# Patient Record
Sex: FEMALE | Race: WHITE | Employment: OTHER | URBAN - METROPOLITAN AREA
[De-identification: names, ages, dates, MRNs, and addresses within clinical notes are randomized per-mention and may not be internally consistent; named-entity substitution may affect disease eponyms.]

---

## 2017-05-31 ENCOUNTER — HOSPITAL ENCOUNTER (EMERGENCY)
Age: 57
Discharge: HOME | End: 2017-05-31
Attending: EMERGENCY MEDICINE
Payer: COMMERCIAL

## 2017-05-31 ENCOUNTER — OFFICE VISIT (OUTPATIENT)
Dept: FAMILY MEDICINE CLINIC | Age: 57
End: 2017-05-31

## 2017-05-31 VITALS
TEMPERATURE: 97.4 F | OXYGEN SATURATION: 98 % | RESPIRATION RATE: 16 BRPM | HEART RATE: 80 BPM | WEIGHT: 144 LBS | HEIGHT: 64 IN | SYSTOLIC BLOOD PRESSURE: 152 MMHG | BODY MASS INDEX: 24.59 KG/M2 | DIASTOLIC BLOOD PRESSURE: 92 MMHG

## 2017-05-31 VITALS
RESPIRATION RATE: 16 BRPM | BODY MASS INDEX: 24.75 KG/M2 | OXYGEN SATURATION: 95 % | SYSTOLIC BLOOD PRESSURE: 130 MMHG | TEMPERATURE: 97.9 F | HEIGHT: 64 IN | DIASTOLIC BLOOD PRESSURE: 76 MMHG | HEART RATE: 76 BPM | WEIGHT: 145 LBS

## 2017-05-31 DIAGNOSIS — R42 DIZZINESS: Primary | ICD-10-CM

## 2017-05-31 DIAGNOSIS — R42 VERTIGO: Primary | ICD-10-CM

## 2017-05-31 LAB
ALBUMIN SERPL BCP-MCNC: 3.9 G/DL (ref 3.5–5)
ALBUMIN/GLOB SERPL: 1.1 {RATIO} (ref 1.1–2.2)
ALP SERPL-CCNC: 64 U/L (ref 45–117)
ALT SERPL-CCNC: 16 U/L (ref 12–78)
ANION GAP BLD CALC-SCNC: 10 MMOL/L (ref 5–15)
APPEARANCE UR: CLEAR
AST SERPL W P-5'-P-CCNC: 13 U/L (ref 15–37)
ATRIAL RATE: 70 BPM
BASOPHILS # BLD AUTO: 0.1 K/UL (ref 0–0.1)
BASOPHILS # BLD: 1 % (ref 0–1)
BILIRUB SERPL-MCNC: 0.2 MG/DL (ref 0.2–1)
BILIRUB UR QL: NEGATIVE
BUN SERPL-MCNC: 17 MG/DL (ref 6–20)
BUN/CREAT SERPL: 22 (ref 12–20)
CALCIUM SERPL-MCNC: 8.8 MG/DL (ref 8.5–10.1)
CALCULATED P AXIS, ECG09: 35 DEGREES
CALCULATED R AXIS, ECG10: 39 DEGREES
CALCULATED T AXIS, ECG11: 46 DEGREES
CHLORIDE SERPL-SCNC: 100 MMOL/L (ref 97–108)
CO2 SERPL-SCNC: 27 MMOL/L (ref 21–32)
COLOR UR: NORMAL
CREAT SERPL-MCNC: 0.76 MG/DL (ref 0.55–1.02)
DIAGNOSIS, 93000: NORMAL
DIFFERENTIAL METHOD BLD: ABNORMAL
EOSINOPHIL # BLD: 0.2 K/UL (ref 0–0.4)
EOSINOPHIL NFR BLD: 2 % (ref 0–7)
ERYTHROCYTE [DISTWIDTH] IN BLOOD BY AUTOMATED COUNT: 13.4 % (ref 11.5–14.5)
GLOBULIN SER CALC-MCNC: 3.5 G/DL (ref 2–4)
GLUCOSE SERPL-MCNC: 137 MG/DL (ref 65–100)
GLUCOSE UR STRIP.AUTO-MCNC: NEGATIVE MG/DL
HCT VFR BLD AUTO: 39.4 % (ref 35–47)
HGB BLD-MCNC: 12.8 G/DL (ref 11.5–16)
HGB UR QL STRIP: NEGATIVE
KETONES UR QL STRIP.AUTO: NEGATIVE MG/DL
LEUKOCYTE ESTERASE UR QL STRIP.AUTO: NEGATIVE
LIPASE SERPL-CCNC: 136 U/L (ref 73–393)
LYMPHOCYTES # BLD AUTO: 10 % (ref 12–49)
LYMPHOCYTES # BLD: 0.8 K/UL (ref 0.8–3.5)
MCH RBC QN AUTO: 31.8 PG (ref 26–34)
MCHC RBC AUTO-ENTMCNC: 32.5 G/DL (ref 30–36.5)
MCV RBC AUTO: 98 FL (ref 80–99)
MONOCYTES # BLD: 0.2 K/UL (ref 0–1)
MONOCYTES NFR BLD AUTO: 2 % (ref 5–13)
NEUTS SEG # BLD: 6.6 K/UL (ref 1.8–8)
NEUTS SEG NFR BLD AUTO: 85 % (ref 32–75)
NITRITE UR QL STRIP.AUTO: NEGATIVE
P-R INTERVAL, ECG05: 128 MS
PH UR STRIP: 7 [PH] (ref 5–8)
PLATELET # BLD AUTO: 304 K/UL (ref 150–400)
POTASSIUM SERPL-SCNC: 3.7 MMOL/L (ref 3.5–5.1)
PROT SERPL-MCNC: 7.4 G/DL (ref 6.4–8.2)
PROT UR STRIP-MCNC: NEGATIVE MG/DL
Q-T INTERVAL, ECG07: 414 MS
QRS DURATION, ECG06: 86 MS
QTC CALCULATION (BEZET), ECG08: 447 MS
RBC # BLD AUTO: 4.02 M/UL (ref 3.8–5.2)
RBC MORPH BLD: ABNORMAL
SODIUM SERPL-SCNC: 137 MMOL/L (ref 136–145)
SP GR UR REFRACTOMETRY: 1.01 (ref 1–1.03)
UROBILINOGEN UR QL STRIP.AUTO: 0.2 EU/DL (ref 0.2–1)
VENTRICULAR RATE, ECG03: 70 BPM
WBC # BLD AUTO: 7.9 K/UL (ref 3.6–11)

## 2017-05-31 PROCEDURE — 74011250637 HC RX REV CODE- 250/637: Performed by: EMERGENCY MEDICINE

## 2017-05-31 PROCEDURE — 80053 COMPREHEN METABOLIC PANEL: CPT | Performed by: EMERGENCY MEDICINE

## 2017-05-31 PROCEDURE — 81003 URINALYSIS AUTO W/O SCOPE: CPT | Performed by: EMERGENCY MEDICINE

## 2017-05-31 PROCEDURE — 83690 ASSAY OF LIPASE: CPT | Performed by: EMERGENCY MEDICINE

## 2017-05-31 PROCEDURE — 85025 COMPLETE CBC W/AUTO DIFF WBC: CPT | Performed by: EMERGENCY MEDICINE

## 2017-05-31 PROCEDURE — 93005 ELECTROCARDIOGRAM TRACING: CPT

## 2017-05-31 PROCEDURE — 96374 THER/PROPH/DIAG INJ IV PUSH: CPT

## 2017-05-31 PROCEDURE — 96361 HYDRATE IV INFUSION ADD-ON: CPT

## 2017-05-31 PROCEDURE — 99285 EMERGENCY DEPT VISIT HI MDM: CPT

## 2017-05-31 PROCEDURE — 36415 COLL VENOUS BLD VENIPUNCTURE: CPT | Performed by: EMERGENCY MEDICINE

## 2017-05-31 PROCEDURE — 74011250636 HC RX REV CODE- 250/636: Performed by: EMERGENCY MEDICINE

## 2017-05-31 RX ORDER — ONDANSETRON 2 MG/ML
4 INJECTION INTRAMUSCULAR; INTRAVENOUS
Status: COMPLETED | OUTPATIENT
Start: 2017-05-31 | End: 2017-05-31

## 2017-05-31 RX ORDER — ONDANSETRON 4 MG/1
4 TABLET, ORALLY DISINTEGRATING ORAL
Qty: 12 TAB | Refills: 0 | Status: SHIPPED | OUTPATIENT
Start: 2017-05-31 | End: 2018-09-06

## 2017-05-31 RX ORDER — MECLIZINE HYDROCHLORIDE 25 MG/1
25 TABLET ORAL
Qty: 20 TAB | Refills: 0 | Status: SHIPPED | OUTPATIENT
Start: 2017-05-31 | End: 2017-06-10

## 2017-05-31 RX ORDER — ALPRAZOLAM 0.25 MG/1
0.25 TABLET ORAL
Status: COMPLETED | OUTPATIENT
Start: 2017-05-31 | End: 2017-05-31

## 2017-05-31 RX ORDER — MECLIZINE HYDROCHLORIDE 25 MG/1
25 TABLET ORAL
Status: COMPLETED | OUTPATIENT
Start: 2017-05-31 | End: 2017-05-31

## 2017-05-31 RX ORDER — ALPRAZOLAM 0.25 MG/1
0.25 TABLET ORAL
Qty: 20 TAB | Refills: 0 | Status: SHIPPED | OUTPATIENT
Start: 2017-05-31 | End: 2018-09-06

## 2017-05-31 RX ADMIN — ALPRAZOLAM 0.25 MG: 0.25 TABLET ORAL at 11:24

## 2017-05-31 RX ADMIN — ONDANSETRON 4 MG: 2 INJECTION INTRAMUSCULAR; INTRAVENOUS at 11:24

## 2017-05-31 RX ADMIN — MECLIZINE HYDROCHLORIDE 25 MG: 25 TABLET ORAL at 11:24

## 2017-05-31 RX ADMIN — SODIUM CHLORIDE 1000 ML: 900 INJECTION, SOLUTION INTRAVENOUS at 11:24

## 2017-05-31 NOTE — PROGRESS NOTES
Subjective:      Brian Bo is an 56 y.o. female who presents for evaluation of dizziness. Having dizziness, not vertigo, starting this am. Never had this bad dizziness before. Vomited twice this morning. Having blurry vision since then. Denies ear pain, ear fullness, CP, SOB, palpitations, HA, trauma, recent outdoor activities, slurred speech, or focal weakness. No hx of cardiac disease, Fhx of stroke. Hasn't tried any medications. She can walk, but feels like she will be off the balance. Review of Systems   Constitutional: Negative for chills and fever. Eyes: Positive for blurred vision. Respiratory: Negative for cough and shortness of breath. Cardiovascular: Negative for chest pain. Gastrointestinal: Positive for nausea and vomiting. Negative for abdominal pain, constipation and diarrhea. Genitourinary: Negative. Neurological: Positive for dizziness. Negative for tingling, tremors, sensory change, speech change, focal weakness, seizures, loss of consciousness and headaches. Psychiatric/Behavioral: Negative. Past Medical History:   Diagnosis Date    CTS (carpal tunnel syndrome) 4/21/2010    IBS (irritable bowel syndrome) 4/21/2010    [facility]         No past surgical history on file. Current Outpatient Prescriptions   Medication Sig Dispense Refill    mesalamine ER (APRISO) 0.375 gram capsule Take 1.5 g by mouth daily.  conj estrog-medroxyprogest ace (PREMPRO) 0.625-5 mg per tablet Take 1 Tab by mouth daily.  hyoscyamine SR (LEVBID) 0.375 mg SR tablet Take 375 mcg by mouth every twelve (12) hours as needed for Cramping.  Mesalamine SR (APRISO) 0.375 gram Cp24 Take  by mouth.  azaTHIOprine (IMURAN) 50 mg tablet Take 50 mg by mouth daily.  hyoscyamine SR (HYOMAX-SR) 0.375 mg SR tablet Take 375 mcg by mouth every twelve (12) hours as needed.         ethynodiol-ethinyl estradiol (ZOVIA 1/35E, 28,) 1-35 mg-mcg per tablet Take  by mouth. No Known Allergies  Family History   Problem Relation Age of Onset    Cancer Father      lung     Social History     Social History    Marital status:      Spouse name: N/A    Number of children: N/A    Years of education: N/A     Occupational History    Not on file. Social History Main Topics    Smoking status: Never Smoker    Smokeless tobacco: Never Used    Alcohol use Yes      Comment: social    Drug use: Not on file    Sexual activity: Yes     Partners: Male     Other Topics Concern    Not on file     Social History Narrative           Objective:     Visit Vitals    BP (!) 152/92 (BP 1 Location: Left arm, BP Patient Position: Standing)    Pulse 80    Temp 97.4 °F (36.3 °C) (Oral)    Resp 16    Ht 5' 4\" (1.626 m)    Wt 144 lb (65.3 kg)    SpO2 98%    BMI 24.72 kg/m2         Physical Exam:  General: calm, relaxed, appears nondistressed   Skin: no rashes or lesions noted, no erythema, ecchymoses, or petechiae, no urticaria   HEENT:  normocephalic/atraumatic, no facial droop, PERLLA/EOMI, pharynx clear,  speech clear and fluent. Conjunctivae/Cornea clear, dried MM. Neck:  supple, no meningismus, no thyromegaly or thyroid tenderness, no detectable cervical bruit, and no JVD  Supple, FROM, normal appearance, symmetrical, trachea midline. No palpable adenopathy   Chest Wall: no tenderness or deformity.    Lungs:  normal symmetric expansion - clear to auscultation, no stridor, rales, rhonchi, or wheezes heard   Cardiac:  regular rate and rhythm, normal S1/S2, no obvious murmur, gallop, or rub, PMI appears nondisplaced   Abdomen:  soft, nontender, normally active bowel sounds, no guarding or rebound, no mass or pulsatile mass; no ballotable ascites or fluid wave   Back:  No CVA tenderness   Musculoskeletal: Gait not tested  Normal symmetry, ROM, strength and tone   Extremities:  no edema or calf tenderness, no palpable cord, pulses intact  No DVT signs   Pulses:  2+ and symmetric all extremities   Neurologic:  awake, alert, oriented x 3, CNs 2-12 intact, no facial droop, speech clear and fluent, no sensory deficit, motor grossly intact,     Very slow gait. During romberg's test, she complaints that she will be off the balance and her body moves left and right. Orthostatic BP: negative. Assessment / Plan:     1. Dizziness  DDx includes vestibular or neurologic origin. Will send her to ED to make sure that she is stablized with hydration and medications. If not, she probably needs work up to rule out stroke. AVS was printed, given to patient and briefly discussed prior to patient's departure from the office today.      Cindy Corona MD  [facility] Medicine Resident  [phone]  Melissa Johnson [address]

## 2017-05-31 NOTE — ED PROVIDER NOTES
Patient is a 56 y.o. female presenting with dizziness. Dizziness   Pertinent negatives include no shortness of breath, no vomiting and no headaches. Pt states that she awoke this morning with dizziness when she got up to walk to the bathroom accompanied by nausea. The dizziness worsens with head movement. She was evaluated at [facility] and sent for further evaluation. Denies fever, cold symptoms, headache, neck pain, visual changes, focal weakness or rash. Denies any difficulty breathing, difficulty swallowing, SOB, chest pain or abdominal pain. She reports nausea but denies any vomiting or diarrhea. Pt. Reports that she has not had any food or medications today prior to arrival.   Old charts reviewed      Past Medical History:   Diagnosis Date    CTS (carpal tunnel syndrome) 4/21/2010    IBS (irritable bowel syndrome) 4/21/2010    [facility]           History reviewed. No pertinent surgical history. Family History:   Problem Relation Age of Onset    Cancer Father      lung       Social History     Social History    Marital status:      Spouse name: N/A    Number of children: N/A    Years of education: N/A     Occupational History    Not on file. Social History Main Topics    Smoking status: Never Smoker    Smokeless tobacco: Never Used    Alcohol use Yes      Comment: social    Drug use: Not on file    Sexual activity: Yes     Partners: Male     Other Topics Concern    Not on file     Social History Narrative         ALLERGIES: Review of patient's allergies indicates no known allergies. Review of Systems   Constitutional: Negative for activity change and appetite change. HENT: Negative for facial swelling, sore throat and trouble swallowing. Eyes: Negative. Respiratory: Negative for shortness of breath. Cardiovascular: Negative. Gastrointestinal: Negative for abdominal pain, diarrhea and vomiting.    Genitourinary: Negative for dysuria. Musculoskeletal: Negative for back pain and neck pain. Skin: Negative for color change. Neurological: Positive for dizziness. Negative for headaches. Psychiatric/Behavioral: Negative. Vitals:    05/31/17 1046 05/31/17 1108 05/31/17 1113 05/31/17 1115   BP: 154/77 136/78  140/80   Pulse: 76      Resp: 16      Temp: 97.9 °F (36.6 °C)      SpO2: 99%  100% 95%   Weight: 65.8 kg (145 lb)      Height: 5' 4\" (1.626 m)               Physical Exam   Constitutional: She is oriented to person, place, and time. She appears well-nourished. White female; non smoker; runs a home ;    HENT:   Head: Normocephalic. Right Ear: External ear normal.   Left Ear: External ear normal.   Mouth/Throat: Oropharynx is clear and moist.   Eyes: Pupils are equal, round, and reactive to light. Neck: Normal range of motion. Neck supple. Cardiovascular: Normal rate and regular rhythm. Pulmonary/Chest: Effort normal and breath sounds normal.   Abdominal: Soft. Bowel sounds are normal.   Musculoskeletal: Normal range of motion. Lymphadenopathy:     She has no cervical adenopathy. Neurological: She is alert and oriented to person, place, and time. Skin: Skin is warm and dry. No rash noted. Nursing note and vitals reviewed. MDM  ED Course       Procedures      EKG reveals NSR without ectopy; ventricular rate 70. Reviewed by Dr. Artem Paredes and me. Yun Barron NP    Pt has been re-examined and is feeling slightly better. 1:25 PM  Patient's results and plan of care have been reviewed with her and her . Patient and/or family have verbally conveyed their understanding and agreement of the patient's signs, symptoms, diagnosis, treatment and prognosis and additionally agree to follow up as recommended or return to the Emergency Room should her condition change prior to follow-up.   Discharge instructions have also been provided to the patient with some educational information regarding her diagnosis as well a list of reasons why she would want to return to the ER prior to her follow-up appointment should her condition change. Hima Soares NP  Discussed plan of care with Dr. Duncan Freeman.  Hima Soares NP

## 2017-05-31 NOTE — MR AVS SNAPSHOT
Visit Information Date & Time Provider Department Dept. Phone Encounter #  
 5/31/2017  9:30 AM Jean-Pierre Casey MD [address] [facility] [phone] [id number] Upcoming Health Maintenance Date Due Hepatitis C Screening 1960 FOBT Q 1 YEAR AGE 50-75 3/23/2016 BREAST CANCER SCRN MAMMOGRAM 5/1/2016 PAP AKA CERVICAL CYTOLOGY 5/1/2017 INFLUENZA AGE 9 TO ADULT 8/1/2017 DTaP/Tdap/Td series (2 - Td) 3/23/2025 Allergies as of 5/31/2017  Review Complete On: 5/31/2017 By: Jean-Pierre Casey MD  
 No Known Allergies Current Immunizations  Reviewed on 3/23/2015 Name Date Influenza Vaccine Split 11/15/2010 Influenza Vaccine Whole 12/30/2009 Tdap 3/23/2015 Not reviewed this visit You Were Diagnosed With   
  
 Codes Comments Dizziness    -  Primary ICD-10-CM: I77 ICD-9-CM: 780.4 Vitals BP Pulse Temp Resp Height(growth percentile) Weight(growth percentile) (!) 152/92 (BP 1 Location: Left arm, BP Patient Position: Standing) 80 97.4 °F (36.3 °C) (Oral) 16 5' 4\" (1.626 m) 144 lb (65.3 kg) SpO2 BMI OB Status Smoking Status 98% 24.72 kg/m2 Having regular periods Never Smoker Vitals History BMI and BSA Data Body Mass Index Body Surface Area 24.72 kg/m 2 1.72 m 2 Preferred Pharmacy Pharmacy Name Phone [id number] [address] [facility] [phone] Your Updated Medication List  
  
   
This list is accurate as of: 5/31/17 10:22 AM.  Always use your most recent med list.  
  
  
  
  
 * mesalamine ER 0.375 gram capsule Commonly known as:  APRISO Take 1.5 g by mouth daily. * APRISO 0.375 gram capsule Generic drug:  mesalamine ER Take  by mouth. azaTHIOprine 50 mg tablet Commonly known as:  [employer] Take 50 mg by mouth daily. * LEVBID 0.375 mg SR tablet Generic drug:  hyoscyamine SR  
 Take 375 mcg by mouth every twelve (12) hours as needed for Cramping. * HYOMAX-SR 0.375 mg SR tablet Generic drug:  hyoscyamine SR Take 375 mcg by mouth every twelve (12) hours as needed. PREMPRO 0.625-5 mg per tablet Generic drug:  estrogen (conjugated)-medroxyPROGESTERone Take 1 Tab by mouth daily. ZOVIA 1/35E (28) 1-35 mg-mcg Tab Generic drug:  ethynodiol-ethinyl estradiol Take  by mouth. * Notice: This list has 4 medication(s) that are the same as other medications prescribed for you. Read the directions carefully, and ask your doctor or other care provider to review them with you. Patient Instructions Dizziness: Care Instructions Your Care Instructions Dizziness is the feeling of unsteadiness or fuzziness in your head. It is different than having vertigo, which is a feeling that the room is spinning or that you are moving or falling. It is also different from lightheadedness, which is the feeling that you are about to faint. It can be hard to know what causes dizziness. Some people feel dizzy when they have migraine headaches. Sometimes bouts of flu can make you feel dizzy. Some medical conditions, such as heart problems or high blood pressure, can make you feel dizzy. Many medicines can cause dizziness, including medicines for high blood pressure, pain, or anxiety. If a medicine causes your symptoms, your doctor may recommend that you stop or change the medicine. If it is a problem with your heart, you may need medicine to help your heart work better. If there is no clear reason for your symptoms, your doctor may suggest watching and waiting for a while to see if the dizziness goes away on its own. Follow-up care is a key part of your treatment and safety. Be sure to make and go to all appointments, and call your doctor if you are having problems. It's also a good idea to know your test results and keep a list of the medicines you take. How can you care for yourself at home? · If your doctor recommends or prescribes medicine, take it exactly as directed. Call your doctor if you think you are having a problem with your medicine. · Do not drive while you feel dizzy. · Try to prevent falls. Steps you can take include: ¨ Using nonskid mats, adding grab bars near the tub, and using night-lights. ¨ Clearing your home so that walkways are free of anything you might trip on. ¨ Letting family and friends know that you have been feeling dizzy. This will help them know how to help you. When should you call for help? Call 911 anytime you think you may need emergency care. For example, call if: 
· You passed out (lost consciousness). · You have dizziness along with symptoms of a heart attack. These may include: ¨ Chest pain or pressure, or a strange feeling in the chest. 
¨ Sweating. ¨ Shortness of breath. ¨ Nausea or vomiting. ¨ Pain, pressure, or a strange feeling in the back, neck, jaw, or upper belly or in one or both shoulders or arms. ¨ Lightheadedness or sudden weakness. ¨ A fast or irregular heartbeat. · You have symptoms of a stroke. These may include: 
¨ Sudden numbness, tingling, weakness, or loss of movement in your face, arm, or leg, especially on only one side of your body. ¨ Sudden vision changes. ¨ Sudden trouble speaking. ¨ Sudden confusion or trouble understanding simple statements. ¨ Sudden problems with walking or balance. ¨ A sudden, severe headache that is different from past headaches. Call your doctor now or seek immediate medical care if: 
· You feel dizzy and have a fever, headache, or ringing in your ears. · You have new or increased nausea and vomiting. · Your dizziness does not go away or comes back. Watch closely for changes in your health, and be sure to contact your doctor if: 
· You do not get better as expected. Where can you learn more? Go to http://abdoulaye.info/. Enter S718 in the search box to learn more about \"Dizziness: Care Instructions. \" Current as of: May 27, 2016 Content Version: 11.2 © 9925-9411 [employer], Reading Room. Care instructions adapted under license by [employer] (which disclaims liability or warranty for this information). If you have questions about a medical condition or this instruction, always ask your healthcare professional. [address] any warranty or liability for your use of this information. Introducing [facility]! Rosanna Jean-Baptiste introduces [employer] patient portal. Now you can access parts of your medical record, email your doctor's office, and request medication refills online. 1. In your internet browser, go to [email or web address] 2. Click on the First Time User? Click Here link in the Sign In box. You will see the New Member Sign Up page. 3. Enter your [employer] Access Code exactly as it appears below. You will not need to use this code after youve completed the sign-up process. If you do not sign up before the expiration date, you must request a new code. · [employer] Access Code: [email or web address] Expires: 8/29/2017 10:22 AM 
 
4. Enter the last four digits of your Social Security Number (xxxx) and Date of Birth (mm/dd/yyyy) as indicated and click Submit. You will be taken to the next sign-up page. 5. Create a [facility] ID. This will be your [employer] login ID and cannot be changed, so think of one that is secure and easy to remember. 6. Create a [employer] password. You can change your password at any time. 7. Enter your Password Reset Question and Answer. This can be used at a later time if you forget your password. 8. Enter your e-mail address. You will receive e-mail notification when new information is available in [address] 9. Click Sign Up. You can now view and download portions of your medical record. 10. Click the Download Summary menu link to download a portable copy of your medical information. If you have questions, please visit the Frequently Asked Questions section of the [employer] website. Remember, [employer] is NOT to be used for urgent needs. For medical emergencies, dial 911. Now available from your iPhone and Android! Please provide this summary of care documentation to your next provider. Your primary care clinician is listed as Shelby Lee. If you have any questions after today's visit, please call [phone].

## 2017-05-31 NOTE — DISCHARGE INSTRUCTIONS
We hope that we have addressed all of your medical concerns. The examination and treatment you received in the Emergency Department were for an emergent problem and were not intended as complete care. It is important that you follow up with your healthcare provider(s) for ongoing care. If your symptoms worsen or do not improve as expected, and you are unable to reach your usual health care provider(s), you should return to the Emergency Department. Today's healthcare is undergoing tremendous change, and patient satisfaction surveys are one of the many tools to assess the quality of medical care. You may receive a survey from the [employer] regarding your experience in the Emergency Department. I hope that your experience has been completely positive, particularly the medical care that I provided. As such, please participate in the survey; anything less than excellent does not meet my expectations or intentions. [facility] and 8 [facility] participate in nationally recognized quality of care measures. If your blood pressure is greater than 120/80, as reported below, we urge that you seek medical care to address the potential of high blood pressure, commonly known as hypertension. Hypertension can be hereditary or can be caused by certain medical conditions, pain, stress, or \"white coat syndrome. \"       Please make an appointment with your health care provider(s) for follow up of your Emergency Department visit.        VITALS:   Patient Vitals for the past 8 hrs:   Temp Pulse Resp BP SpO2   05/31/17 1229 - - - - 95 %   05/31/17 1215 - - - 125/76 93 %   05/31/17 1200 - - - 130/71 93 %   05/31/17 1145 - - - 142/77 95 %   05/31/17 1130 - - - 136/70 96 %   05/31/17 1115 - - - 140/80 95 %   05/31/17 1113 - - - - 100 %   05/31/17 1108 - - - 136/78 -   05/31/17 1046 97.9 °F (36.6 °C) 76 16 154/77 99 %          Thank you for allowing us to provide you with medical care today. We realize that you have many choices for your emergency care needs. Please choose us in the future for any continued health care needs. Kvng Pavon NP    [employer]   Office: [phone]            Recent Results (from the past 24 hour(s))   CBC WITH AUTOMATED DIFF    Collection Time: 05/31/17 11:09 AM   Result Value Ref Range    WBC 7.9 3.6 - 11.0 K/uL    RBC 4.02 3.80 - 5.20 M/uL    HGB 12.8 11.5 - 16.0 g/dL    HCT 39.4 35.0 - 47.0 %    MCV 98.0 80.0 - 99.0 FL    MCH 31.8 26.0 - 34.0 PG    MCHC 32.5 30.0 - 36.5 g/dL    RDW 13.4 11.5 - 14.5 %    PLATELET 885 696 - 981 K/uL    NEUTROPHILS 85 (H) 32 - 75 %    LYMPHOCYTES 10 (L) 12 - 49 %    MONOCYTES 2 (L) 5 - 13 %    EOSINOPHILS 2 0 - 7 %    BASOPHILS 1 0 - 1 %    ABS. NEUTROPHILS 6.6 1.8 - 8.0 K/UL    ABS. LYMPHOCYTES 0.8 0.8 - 3.5 K/UL    ABS. MONOCYTES 0.2 0.0 - 1.0 K/UL    ABS. EOSINOPHILS 0.2 0.0 - 0.4 K/UL    ABS. BASOPHILS 0.1 0.0 - 0.1 K/UL    DF SMEAR SCANNED      RBC COMMENTS NORMOCYTIC, NORMOCHROMIC     METABOLIC PANEL, COMPREHENSIVE    Collection Time: 05/31/17 11:09 AM   Result Value Ref Range    Sodium 137 136 - 145 mmol/L    Potassium 3.7 3.5 - 5.1 mmol/L    Chloride 100 97 - 108 mmol/L    CO2 27 21 - 32 mmol/L    Anion gap 10 5 - 15 mmol/L    Glucose 137 (H) 65 - 100 mg/dL    BUN 17 6 - 20 MG/DL    Creatinine 0.76 0.55 - 1.02 MG/DL    BUN/Creatinine ratio 22 (H) 12 - 20      GFR est AA >60 >60 ml/min/1.73m2    GFR est non-AA >60 >60 ml/min/1.73m2    Calcium 8.8 8.5 - 10.1 MG/DL    Bilirubin, total 0.2 0.2 - 1.0 MG/DL    ALT (SGPT) 16 12 - 78 U/L    AST (SGOT) 13 (L) 15 - 37 U/L    Alk.  phosphatase 64 45 - 117 U/L    Protein, total 7.4 6.4 - 8.2 g/dL    Albumin 3.9 3.5 - 5.0 g/dL    Globulin 3.5 2.0 - 4.0 g/dL    A-G Ratio 1.1 1.1 - 2.2     LIPASE    Collection Time: 05/31/17 11:09 AM   Result Value Ref Range    Lipase 136 73 - 393 U/L   URINALYSIS W/ RFLX MICROSCOPIC    Collection Time: 05/31/17 12:37 PM   Result Value Ref Range    Color YELLOW/STRAW      Appearance CLEAR CLEAR      Specific gravity 1.015 1.003 - 1.030      pH (UA) 7.0 5.0 - 8.0      Protein NEGATIVE  NEG mg/dL    Glucose NEGATIVE  NEG mg/dL    Ketone NEGATIVE  NEG mg/dL    Bilirubin NEGATIVE  NEG      Blood NEGATIVE  NEG      Urobilinogen 0.2 0.2 - 1.0 EU/dL    Nitrites NEGATIVE  NEG      Leukocyte Esterase NEGATIVE  NEG         No results found. Vertigo: Care Instructions  Your Care Instructions  Vertigo is the feeling that you or your surroundings are moving when there is no actual movement. It is often described as a feeling of spinning, whirling, falling, or tilting. Vertigo may make you vomit or feel nauseated. You may have trouble standing or walking and may lose your balance. Vertigo is often related to an inner ear problem, but it can have other more serious causes. If vertigo continues, you may need more tests to find its cause. Follow-up care is a key part of your treatment and safety. Be sure to make and go to all appointments, and call your doctor if you are having problems. Its also a good idea to know your test results and keep a list of the medicines you take. How can you care for yourself at home? · Do not lie flat on your back. Prop yourself up slightly. This may reduce the spinning feeling. Keep your eyes open. · Move slowly so that you do not fall. · If your doctor recommends medicine, take it exactly as directed. · Do not drive while you are having vertigo. Certain exercises, called Yates-Daroff exercises, can help decrease vertigo. To do Yates-Daroff exercises:  · Sit on the edge of a bed or sofa and quickly lie down on the side that causes the worst vertigo. Lie on your side with your ear down. · Stay in this position for at least 30 seconds or until the vertigo goes away. · Sit up. If this causes vertigo, wait for it to stop.   · Repeat the procedure on the other side. · Repeat this 10 times. Do these exercises 2 times a day until the vertigo is gone. When should you call for help? Call 911 anytime you think you may need emergency care. For example, call if:  · You passed out (lost consciousness). · You have symptoms of a stroke. These may include:  ¨ Sudden numbness, tingling, weakness, or loss of movement in your face, arm, or leg, especially on only one side of your body. ¨ Sudden vision changes. ¨ Sudden trouble speaking. ¨ Sudden confusion or trouble understanding simple statements. ¨ Sudden problems with walking or balance. ¨ A sudden, severe headache that is different from past headaches. Call your doctor now or seek immediate medical care if:  · Vertigo occurs with a fever, a headache, or ringing in your ears. · You have new or increased nausea and vomiting. Watch closely for changes in your health, and be sure to contact your doctor if:  · Vertigo gets worse or happens more often. · Vertigo has not gotten better after 2 weeks. Where can you learn more? Go to http://juana-simona.info/. Enter F500 in the search box to learn more about \"Vertigo: Care Instructions. \"  Current as of: July 29, 2016  Content Version: 11.2  © 5179-0941 [employer]. Care instructions adapted under license by [employer] (which disclaims liability or warranty for this information). If you have questions about a medical condition or this instruction, always ask your healthcare professional. [place] [address] any warranty or liability for your use of this information. Dizziness: Care Instructions  Your Care Instructions  Dizziness is the feeling of unsteadiness or fuzziness in your head. It is different than having vertigo, which is a feeling that the room is spinning or that you are moving or falling. It is also different from lightheadedness, which is the feeling that you are about to faint.   It can be hard to know what causes dizziness. Some people feel dizzy when they have migraine headaches. Sometimes bouts of flu can make you feel dizzy. Some medical conditions, such as heart problems or high blood pressure, can make you feel dizzy. Many medicines can cause dizziness, including medicines for high blood pressure, pain, or anxiety. If a medicine causes your symptoms, your doctor may recommend that you stop or change the medicine. If it is a problem with your heart, you may need medicine to help your heart work better. If there is no clear reason for your symptoms, your doctor may suggest watching and waiting for a while to see if the dizziness goes away on its own. Follow-up care is a key part of your treatment and safety. Be sure to make and go to all appointments, and call your doctor if you are having problems. It's also a good idea to know your test results and keep a list of the medicines you take. How can you care for yourself at home? · If your doctor recommends or prescribes medicine, take it exactly as directed. Call your doctor if you think you are having a problem with your medicine. · Do not drive while you feel dizzy. · Try to prevent falls. Steps you can take include:  ¨ Using nonskid mats, adding grab bars near the tub, and using night-lights. ¨ Clearing your home so that walkways are free of anything you might trip on. ¨ Letting family and friends know that you have been feeling dizzy. This will help them know how to help you. When should you call for help? Call 911 anytime you think you may need emergency care. For example, call if:  · You passed out (lost consciousness). · You have dizziness along with symptoms of a heart attack. These may include:  ¨ Chest pain or pressure, or a strange feeling in the chest.  ¨ Sweating. ¨ Shortness of breath. ¨ Nausea or vomiting.   ¨ Pain, pressure, or a strange feeling in the back, neck, jaw, or upper belly or in one or both shoulders or arms.  ¨ Lightheadedness or sudden weakness. ¨ A fast or irregular heartbeat. · You have symptoms of a stroke. These may include:  ¨ Sudden numbness, tingling, weakness, or loss of movement in your face, arm, or leg, especially on only one side of your body. ¨ Sudden vision changes. ¨ Sudden trouble speaking. ¨ Sudden confusion or trouble understanding simple statements. ¨ Sudden problems with walking or balance. ¨ A sudden, severe headache that is different from past headaches. Call your doctor now or seek immediate medical care if:  · You feel dizzy and have a fever, headache, or ringing in your ears. · You have new or increased nausea and vomiting. · Your dizziness does not go away or comes back. Watch closely for changes in your health, and be sure to contact your doctor if:  · You do not get better as expected. Where can you learn more? Go to http://juana-simona.info/. Enter H997 in the search box to learn more about \"Dizziness: Care Instructions. \"  Current as of: May 27, 2016  Content Version: 11.2  © 6109-5952 [employer]. Care instructions adapted under license by "[employer] (which disclaims liability or warranty for this information). If you have questions about a medical condition or this instruction, always ask your healthcare professional. [address] any warranty or liability for your use of this information. [place]orne Exercises for Vertigo: Care Instructions  Your Care Instructions  Simple exercises can help you regain your balance when you have vertigo. If you have Ménière's disease, benign paroxysmal positional vertigo (BPPV), or another inner ear problem, you may have vertigo off and on. Do these exercises first thing in the morning and before you go to bed. You might get dizzy when you first start them. If this happens, try to do them for at least 5 minutes.  Do a group of exercises at a time, starting at the top of the list. It may take several weeks before you can do all the exercises without feeling dizzy. Follow-up care is a key part of your treatment and safety. Be sure to make and go to all appointments, and call your doctor if you are having problems. It's also a good idea to know your test results and keep a list of the medicines you take. How can you care for yourself at home? Exercise 1  While sitting on the side of the bed and holding your head still:  · Look up as far as you can. · Look down as far as you can. · Look from side to side as far as you can. · Stretch your arm straight out in front of you. Focus on your index finger. Continue to focus on your finger while you bring it to your nose. Exercise 2  While sitting on the side of the bed:  · Bring your head as far back as you can. · Bring your head forward to touch your chin to your chest.  · Turn your head from side to side. · Do these exercises first with your eyes open. Then try with your eyes closed. Exercise 3  While sitting on the side of the bed:  · Shrug your shoulders straight upward, then relax them. · Bend over and try to touch the ground with your fingers. Then go back to a sitting position. · Toss a small ball from one hand to the other. Throw the ball higher than your eyes so you have to look up. Exercise 4  While standing (with someone close by if you feel uncomfortable):  · Repeat Exercise 1.  · Repeat Exercise 2.  · Pass a ball between your legs and above your head. · Sit down and then stand up. Repeat. Turn around in a [place] a different way each time you stand. · With someone close by to help you, try the above exercises with your eyes closed. Exercise 5  In a room that is cleared of obstacles:  · Walk to a corner of the room, turn to your right, and walk back to the starting point. Now, repeat and turn left. · Walk up and down a slope. Now try stairs.   · While holding on to someone's arm, try these exercises with your eyes closed. When should you call for help? Watch closely for changes in your health, and be sure to contact your doctor if:  · Your vertigo gets worse. · You want more information about vertigo. · You want more information about exercises for vertigo. Where can you learn more? Go to http://juana-simona.info/. Enter X515 in the search box to learn more about \"Cawthorne Exercises for Vertigo: Care Instructions. \"  Current as of: July 29, 2016  Content Version: 11.2  © 2438-9247 [employer]. Care instructions adapted under license by "[employer]" (which disclaims liability or warranty for this information). If you have questions about a medical condition or this instruction, always ask your healthcare professional. [address] any warranty or liability for your use of this information.

## 2017-05-31 NOTE — PATIENT INSTRUCTIONS
Dizziness: Care Instructions  Your Care Instructions  Dizziness is the feeling of unsteadiness or fuzziness in your head. It is different than having vertigo, which is a feeling that the room is spinning or that you are moving or falling. It is also different from lightheadedness, which is the feeling that you are about to faint. It can be hard to know what causes dizziness. Some people feel dizzy when they have migraine headaches. Sometimes bouts of flu can make you feel dizzy. Some medical conditions, such as heart problems or high blood pressure, can make you feel dizzy. Many medicines can cause dizziness, including medicines for high blood pressure, pain, or anxiety. If a medicine causes your symptoms, your doctor may recommend that you stop or change the medicine. If it is a problem with your heart, you may need medicine to help your heart work better. If there is no clear reason for your symptoms, your doctor may suggest watching and waiting for a while to see if the dizziness goes away on its own. Follow-up care is a key part of your treatment and safety. Be sure to make and go to all appointments, and call your doctor if you are having problems. It's also a good idea to know your test results and keep a list of the medicines you take. How can you care for yourself at home? · If your doctor recommends or prescribes medicine, take it exactly as directed. Call your doctor if you think you are having a problem with your medicine. · Do not drive while you feel dizzy. · Try to prevent falls. Steps you can take include:  ¨ Using nonskid mats, adding grab bars near the tub, and using night-lights. ¨ Clearing your home so that walkways are free of anything you might trip on. ¨ Letting family and friends know that you have been feeling dizzy. This will help them know how to help you. When should you call for help? Call 911 anytime you think you may need emergency care.  For example, call if:  · You passed out (lost consciousness). · You have dizziness along with symptoms of a heart attack. These may include:  ¨ Chest pain or pressure, or a strange feeling in the chest.  ¨ Sweating. ¨ Shortness of breath. ¨ Nausea or vomiting. ¨ Pain, pressure, or a strange feeling in the back, neck, jaw, or upper belly or in one or both shoulders or arms. ¨ Lightheadedness or sudden weakness. ¨ A fast or irregular heartbeat. · You have symptoms of a stroke. These may include:  ¨ Sudden numbness, tingling, weakness, or loss of movement in your face, arm, or leg, especially on only one side of your body. ¨ Sudden vision changes. ¨ Sudden trouble speaking. ¨ Sudden confusion or trouble understanding simple statements. ¨ Sudden problems with walking or balance. ¨ A sudden, severe headache that is different from past headaches. Call your doctor now or seek immediate medical care if:  · You feel dizzy and have a fever, headache, or ringing in your ears. · You have new or increased nausea and vomiting. · Your dizziness does not go away or comes back. Watch closely for changes in your health, and be sure to contact your doctor if:  · You do not get better as expected. Where can you learn more? Go to http://juana-simona.info/. Enter S373 in the search box to learn more about \"Dizziness: Care Instructions. \"  Current as of: May 27, 2016  Content Version: 11.2  © 3217-7885 [employer]. Care instructions adapted under license by [employer] (which disclaims liability or warranty for this information). If you have questions about a medical condition or this instruction, always ask your healthcare professional. [place] [address] any warranty or liability for your use of this information.

## 2017-05-31 NOTE — ED NOTES
Patient stated she was feeling better then when she got up to go to the bathroom she felt dizzy again.

## 2017-06-01 NOTE — PROGRESS NOTES
I saw and evaluated the patient, performing the key elements of the service. I discussed the findings, assessment and plan with the resident and agree with the resident's findings and plan as documented in the resident's note. i personally performed a limited neurological exam based off of the positive rhomberg exam. Stable cerebellar testing but does elicit worsening dizziness. DDX includes vestibular neuronitis, idiopathic, BPPV. Due to the concern for new onset symptoms and pos rhomberg will send to the ED for further evaluation.

## 2018-02-01 ENCOUNTER — CLINICAL SUPPORT (OUTPATIENT)
Dept: FAMILY MEDICINE CLINIC | Age: 58
End: 2018-02-01

## 2018-02-01 DIAGNOSIS — Z23 ENCOUNTER FOR IMMUNIZATION: Primary | ICD-10-CM

## 2018-06-30 ENCOUNTER — OFFICE VISIT (OUTPATIENT)
Dept: FAMILY MEDICINE CLINIC | Age: 58
End: 2018-06-30

## 2018-06-30 VITALS
OXYGEN SATURATION: 96 % | SYSTOLIC BLOOD PRESSURE: 154 MMHG | WEIGHT: 154.2 LBS | DIASTOLIC BLOOD PRESSURE: 88 MMHG | BODY MASS INDEX: 26.32 KG/M2 | TEMPERATURE: 97.9 F | HEIGHT: 64 IN | RESPIRATION RATE: 16 BRPM | HEART RATE: 82 BPM

## 2018-06-30 DIAGNOSIS — M79.2 NEUROPATHIC PAIN SYNDROME (NON-HERPETIC): ICD-10-CM

## 2018-06-30 DIAGNOSIS — B02.9 HERPES ZOSTER WITHOUT COMPLICATION: Primary | ICD-10-CM

## 2018-06-30 RX ORDER — OXYCODONE AND ACETAMINOPHEN 5; 325 MG/1; MG/1
TABLET ORAL
COMMUNITY
Start: 2018-06-27 | End: 2018-09-06

## 2018-06-30 RX ORDER — OXYCODONE AND ACETAMINOPHEN 5; 325 MG/1; MG/1
1 TABLET ORAL
Qty: 20 TAB | Refills: 0 | Status: SHIPPED | OUTPATIENT
Start: 2018-06-30 | End: 2018-09-06

## 2018-06-30 RX ORDER — IBUPROFEN 800 MG/1
TABLET ORAL
COMMUNITY
Start: 2018-06-27 | End: 2018-09-06

## 2018-06-30 RX ORDER — ACYCLOVIR 800 MG/1
800 TABLET ORAL
Qty: 35 TAB | Refills: 0 | Status: SHIPPED | OUTPATIENT
Start: 2018-06-30 | End: 2018-07-07

## 2018-06-30 NOTE — PROGRESS NOTES
Chief Complaint   Patient presents with    Rash     rash on back and chest X 1 week  patient states rash on back radiates to chest     1. Have you been to the ER, urgent care clinic since your last visit? Hospitalized since your last visit? Yes When: 6/26/18 [place] [facility] [place] Reason for visit: [place]sterectomy    2. Have you seen or consulted any other health care providers outside of the [address] [place] [address] since your last visit? Include any pap smears or colon screening.  No

## 2018-06-30 NOTE — PROGRESS NOTES
HISTORY OF PRESENT ILLNESS  John Garcia is a 66 y.o. female. HPI   This 66year old lady presents with complaints of \"shingles\"  She complains of a rash that started about 3 days ago, shortly after a hysterectomy. The rash is described as very painful and sharp. It started on her left upper back and has gradually spread across to her left breast area. States she is in a lot of pain and having difficulty sleeping at night secondary to pain  No other complaints    Review of Systems   Constitutional: Negative for fever. HENT: Negative for congestion. Musculoskeletal: Negative for myalgias. Skin: Positive for rash. Physical Exam   Constitutional: She appears well-developed and well-nourished. No distress. Skin: Skin is warm and dry. Rash noted. She is not diaphoretic. Left back/left breast (in the presence of nurse janay)-multiple vesicular clustered lesions consistent with zoster,very extensive on an erythematous base       ASSESSMENT and PLAN    ICD-10-CM ICD-9-CM    1. Herpes zoster without complication M13.7 313.5 oxyCODONE-acetaminophen (PERCOCET) 5-325 mg per tablet   2. Neuropathic pain syndrome (non-herpetic) M79.2 729.2    start zovirax  Percocet prn for pain  Due to the very extensive nature of the rash, I have recommended short term follow up within the next week.   Precautions given

## 2018-09-06 ENCOUNTER — OFFICE VISIT (OUTPATIENT)
Dept: FAMILY MEDICINE CLINIC | Age: 58
End: 2018-09-06

## 2018-09-06 VITALS
HEIGHT: 64 IN | DIASTOLIC BLOOD PRESSURE: 83 MMHG | WEIGHT: 148 LBS | TEMPERATURE: 98.4 F | OXYGEN SATURATION: 100 % | RESPIRATION RATE: 18 BRPM | HEART RATE: 64 BPM | SYSTOLIC BLOOD PRESSURE: 148 MMHG | BODY MASS INDEX: 25.27 KG/M2

## 2018-09-06 DIAGNOSIS — B02.8 HERPES ZOSTER WITH OTHER COMPLICATION: Primary | ICD-10-CM

## 2018-09-06 DIAGNOSIS — K52.9 COLITIS, NONSPECIFIC: ICD-10-CM

## 2018-09-06 RX ORDER — METHYLPREDNISOLONE 4 MG/1
TABLET ORAL
Qty: 1 DOSE PACK | Refills: 0 | Status: SHIPPED | OUTPATIENT
Start: 2018-09-06 | End: 2020-12-15

## 2018-09-06 RX ORDER — VALACYCLOVIR HYDROCHLORIDE 1 G/1
1000 TABLET, FILM COATED ORAL 3 TIMES DAILY
Qty: 30 TAB | Refills: 0 | Status: SHIPPED | OUTPATIENT
Start: 2018-09-06 | End: 2020-12-15

## 2018-09-06 NOTE — MR AVS SNAPSHOT
[address] [phone] Patient: Emiliano Stern MRN: [id number] CQN:[id number] Visit Information Date & Time Provider Department Dept. Phone Encounter #  
 9/6/2018 10:50 AM Kvng Saldaña MD [address] [facility] [phone] [id number] Upcoming Health Maintenance Date Due Hepatitis C Screening 1960 FOBT Q 1 YEAR AGE 50-75 3/23/2016 BREAST CANCER SCRN MAMMOGRAM 5/1/2016 PAP AKA CERVICAL CYTOLOGY 5/1/2017 Influenza Age 5 to Adult 8/1/2018 DTaP/Tdap/Td series (2 - Td) 3/23/2025 Allergies as of 9/6/2018  Review Complete On: 9/6/2018 By: Sudhir Carrasco LPN No Known Allergies Current Immunizations  Reviewed on 9/6/2018 Name Date Influenza Vaccine (Quad) PF 2/1/2018 Influenza Vaccine Split 11/15/2010 Influenza Vaccine Whole 12/30/2009 Tdap 3/23/2015 Reviewed by Sudhir Carrasco LPN on 8/4/1899 at 07:46 AM  
You Were Diagnosed With   
  
 Codes Comments Herpes zoster with other complication    -  Primary ICD-10-CM: B02.8 ICD-9-CM: 053.79 Vitals BP Pulse Temp Resp Height(growth percentile) Weight(growth percentile) 148/83 (BP 1 Location: Left arm, BP Patient Position: Sitting) 64 98.4 °F (36.9 °C) (Oral) 18 5' 4\" (1.626 m) 148 lb (67.1 kg) LMP SpO2 BMI OB Status Smoking Status 07/12/2016 100% 25.4 kg/m2 Hysterectomy Never Smoker BMI and BSA Data Body Mass Index Body Surface Area  
 25.4 kg/m 2 1.74 m 2 Preferred Pharmacy Pharmacy Name Phone [id number] [address] AT [facility] [place] [phone] Your Updated Medication List  
  
   
This list is accurate as of 9/6/18 11:07 AM.  Always use your most recent med list.  
  
  
  
  
 ALPRAZolam 0.25 mg tablet Commonly known as:  Melene Reels Take 1 Tab by mouth every eight (8) hours as needed (vertigo/dizziness). Max Daily Amount: 0.75 mg.  
  
 azaTHIOprine 50 mg tablet Commonly known as:  [employer] Take 50 mg by mouth daily. * LEVBID 0.375 mg SR tablet Generic drug:  hyoscyamine SR Take 375 mcg by mouth every twelve (12) hours as needed for Cramping. * HYOMAX-SR 0.375 mg SR tablet Generic drug:  hyoscyamine SR Take 375 mcg by mouth every twelve (12) hours as needed. mesalamine ER 0.375 gram 24 hour capsule Commonly known as:  APRISO Take 1.5 g by mouth daily. methylPREDNISolone 4 mg tablet Commonly known as:  [place] Kaitlynn Take with food PREMARIN 0.625 mg tablet Generic drug:  conjugated estrogens Take 0.625 mg by mouth daily. PREMPRO 0.625-5 mg per tablet Generic drug:  estrogen (conjugated)-medroxyPROGESTERone Take 1 Tab by mouth daily. valACYclovir 1 gram tablet Commonly known as:  VALTREX Take 1 Tab by mouth three (3) times daily. ZOVIA 1/35E (28) 1-35 mg-mcg Tab Generic drug:  ethynodiol-ethinyl estradiol Take  by mouth. * Notice: This list has 2 medication(s) that are the same as other medications prescribed for you. Read the directions carefully, and ask your doctor or other care provider to review them with you. Prescriptions Printed Refills  
 valACYclovir (VALTREX) 1 gram tablet 0 Sig: Take 1 Tab by mouth three (3) times daily. Class: Print Route: Oral  
 methylPREDNISolone (MEDROL DOSEPACK) 4 mg tablet 0 Sig: Take with food Class: Print We Performed the Following REFERRAL TO DERMATOLOGY [REF19 Custom] Comments:  
 See dr. Ely Castillo or associate #[phone] Referral Information Referral ID Referred By Referred To  
  
 [id number] Valery Paredes MD   
   [address] [facility] Phone: [phone] Fax: [phone] Visits Status Start Date End Date 1 New Request 9/6/18 9/6/19 If your referral has a status of pending review or denied, additional information will be sent to support the outcome of this decision. Patient Instructions Take Valtrex and medrol with food Apply Aveeno lotion topically See Dr. Debbie Gonzales or associate #[phone] Return soon for [facility] exam 
 
You will need Shingles vaccine 6 weeks after rash goes away Introducing [facility]! [employer] introduces Weplay patient portal. Now you can access parts of your medical record, email your doctor's office, and request medication refills online. 1. In your internet browser, go to [email or web address] 2. Click on the First Time User? Click Here link in the Sign In box. You will see the New Member Sign Up page. 3. Enter your Weplay Access Code exactly as it appears below. You will not need to use this code after youve completed the sign-up process. If you do not sign up before the expiration date, you must request a new code. · Weplay Access Code: [email or web address] Expires: 9/28/2018  1:28 PM 
 
4. Enter the last four digits of your Social Security Number (xxxx) and Date of Birth (mm/dd/yyyy) as indicated and click Submit. You will be taken to the next sign-up page. 5. Create a Weplay ID. This will be your Weplay login ID and cannot be changed, so think of one that is secure and easy to remember. 6. Create a Weplay password. You can change your password at any time. 7. Enter your Password Reset Question and Answer. This can be used at a later time if you forget your password. 8. Enter your e-mail address. You will receive e-mail notification when new information is available in [address] 9. Click Sign Up. You can now view and download portions of your medical record. 10. Click the Download Summary menu link to download a portable copy of your medical information.  
 
If you have questions, please visit the Frequently Asked Questions section of the [employer]. Remember, [employer] is NOT to be used for urgent needs. For medical emergencies, dial 911. Now available from your iPhone and Android! Please provide this summary of care documentation to your next provider. Your primary care clinician is listed as Jojo Gilmore. If you have any questions after today's visit, please call [phone].

## 2018-09-06 NOTE — PATIENT INSTRUCTIONS
Take Valtrex and medrol with food    Apply Aveeno lotion topically    See Dr. Leandro Sena or associate #[phone]    Return soon for [facility] exam    You will need Shingles vaccine 6 weeks after rash goes away

## 2018-09-06 NOTE — PROGRESS NOTES
Hilda Rodriguez is a 66 y.o. female      Issues discussed today include:        Signs and symptoms:  Shingles-like rash on back  Duration:  Months (started July but did not respond to Zovirax)  Context:  Looks odd, not typical shingles  Location: It seems to cross midline  Quality:  itchig  Severity:  Area of lesions 8 x 8 inches mid back  Timing:  Still there since June  Modifying factors:  Rx Valtrex/steroids. She is on Imuran which may be altering her immune response    Data reviewed or ordered today:  Needs WELLNESS exam and Shingrix eventually    Other problems include:  Patient Active Problem List   Diagnosis Code    HTN (hypertension) I10    CTS (carpal tunnel syndrome) G56.00    IBS (irritable bowel syndrome) K58.9    Colitis, nonspecific K52.9       Medications:  Current Outpatient Prescriptions   Medication Sig Dispense Refill    conjugated estrogens (PREMARIN) 0.625 mg tablet Take 0.625 mg by mouth daily.  valACYclovir (VALTREX) 1 gram tablet Take 1 Tab by mouth three (3) times daily. 30 Tab 0    methylPREDNISolone (MEDROL DOSEPACK) 4 mg tablet Take with food 1 Dose Pack 0    mesalamine ER (APRISO) 0.375 gram capsule Take 1.5 g by mouth daily.  azaTHIOprine (IMURAN) 50 mg tablet Take 50 mg by mouth daily.  conj estrog-medroxyprogest ace (PREMPRO) 0.625-5 mg per tablet Take 1 Tab by mouth daily.  hyoscyamine SR (LEVBID) 0.375 mg SR tablet Take 375 mcg by mouth every twelve (12) hours as needed for Cramping.  hyoscyamine SR (HYOMAX-SR) 0.375 mg SR tablet Take 375 mcg by mouth every twelve (12) hours as needed.  ethynodiol-ethinyl estradiol (ZOVIA 1/35E, 28,) 1-35 mg-mcg per tablet Take  by mouth. Allergies:  No Known Allergies    LMP:  Patient's last menstrual period was 07/12/2016.     Social History     Social History    Marital status:      Spouse name: N/A    Number of children: N/A    Years of education: N/A     Occupational History    Not on file. Social History Main Topics    Smoking status: Never Smoker    Smokeless tobacco: Never Used    Alcohol use Yes      Comment: social    Drug use: Not on file    Sexual activity: Yes     Partners: Male     Other Topics Concern    Not on file     Social History Narrative         Family History   Problem Relation Age of Onset    Cancer Father      lung       Meaningful use:  done      ROS:  Headaches:  no  Chest Pain:  no  SOB:  no  Fevers:  no  Other significant ROS:  She keeps children and we discussed infection control    Patient's last menstrual period was 07/12/2016. Physical Exam  Visit Vitals    /83 (BP 1 Location: Left arm, BP Patient Position: Sitting)    Pulse 64    Temp 98.4 °F (36.9 °C) (Oral)    Resp 18    Ht 5' 4\" (1.626 m)    Wt 148 lb (67.1 kg)    LMP 07/12/2016    SpO2 100%    BMI 25.4 kg/m2     BP Readings from Last 3 Encounters:   09/06/18 148/83   06/30/18 154/88   05/31/17 130/76     Constitutional:  Appears well,  No Acute Distress, Vitals noted  Psychiatric:   Affect normal, Alert and cooperative, Oriented to person/place/time    Skin:  Odd looking blisters on her back in 8 x 8 inch  Patch but it crosses midline           Assessment:    Patient Active Problem List   Diagnosis Code    HTN (hypertension) I10    CTS (carpal tunnel syndrome) G56.00    IBS (irritable bowel syndrome) K58.9    Colitis, nonspecific K52.9       Today's diagnoses are:    ICD-10-CM ICD-9-CM    1. Herpes zoster with other complication J89.1 386.87 REFERRAL TO DERMATOLOGY      valACYclovir (VALTREX) 1 gram tablet      methylPREDNISolone (MEDROL DOSEPACK) 4 mg tablet   2.  Colitis, nonspecific K52.9 558.9     on Imuran       Plan:  Orders Placed This Encounter    REFERRAL TO DERMATOLOGY     Referral Priority:   Routine     Referral Type:   Consultation     Referral Reason:   Specialty Services Required     Referred to Provider:   Gerhardt Bolds, MD     Requested Specialty:   Dermatology  conjugated estrogens (PREMARIN) 0.625 mg tablet     Sig: Take 0.625 mg by mouth daily.  valACYclovir (VALTREX) 1 gram tablet     Sig: Take 1 Tab by mouth three (3) times daily. Dispense:  30 Tab     Refill:  0    methylPREDNISolone (MEDROL DOSEPACK) 4 mg tablet     Sig: Take with food     Dispense:  1 Dose Pack     Refill:  0       See patient instructions  Patient Instructions   Take Valtrex and medrol with food    Apply Aveeno lotion topically    See Dr. Tatiana Littlejohn or associate #[phone]    Return soon for [facility] exam    You will need Shingles vaccine 6 weeks after rash goes away        refresh note:  done    AVS Printed:  done      Diagnoses and all orders for this visit:    1. Herpes zoster with other complication  -     REFERRAL TO DERMATOLOGY  -     valACYclovir (VALTREX) 1 gram tablet; Take 1 Tab by mouth three (3) times daily. -     methylPREDNISolone (MEDROL DOSEPACK) 4 mg tablet; Take with food    2.  Colitis, nonspecific  Comments:  on Imuran

## 2018-09-06 NOTE — PROGRESS NOTES
1. Have you been to the ER, urgent care clinic since your last visit? Hospitalized since your last visit? No    2. Have you seen or consulted any other health care providers outside of the [address] [place] [address] since your last visit? Include any pap smears or colon screening. No    Chief Complaint   Patient presents with    Rash     shingles at end of June     Patient states has patch on back that keeps breaking out in blisters. Patch of blisters noted on mid back. Patient has been using OTC shingles skincare ointment Terrasil    Blood pressure 148/83, pulse 64, temperature 98.4 °F (36.9 °C), temperature source Oral, resp. rate 18, height 5' 4\" (1.626 m), weight 148 lb (67.1 kg), last menstrual period 07/12/2016, SpO2 100 %.

## 2020-12-08 ENCOUNTER — TRANSCRIBE ORDER (OUTPATIENT)
Dept: INTERNAL MEDICINE CLINIC | Age: 60
End: 2020-12-08

## 2020-12-15 ENCOUNTER — VIRTUAL VISIT (OUTPATIENT)
Dept: FAMILY MEDICINE CLINIC | Age: 60
End: 2020-12-15
Payer: COMMERCIAL

## 2020-12-15 DIAGNOSIS — K52.9 COLITIS, NONSPECIFIC: ICD-10-CM

## 2020-12-15 DIAGNOSIS — Z00.00 ENCOUNTER FOR ANNUAL PHYSICAL EXAM: Primary | ICD-10-CM

## 2020-12-15 PROCEDURE — 99396 PREV VISIT EST AGE 40-64: CPT | Performed by: STUDENT IN AN ORGANIZED HEALTH CARE EDUCATION/TRAINING PROGRAM

## 2020-12-15 RX ORDER — ESTRADIOL 1 MG/1
TABLET ORAL
COMMUNITY
Start: 2020-12-11 | End: 2020-12-15

## 2020-12-15 NOTE — Clinical Note
Fasting lab appt needed. Lab ordered. Not urgent and can be done anytime in next 2 months, discussed this with patient. Covid screen neg. Thank you!

## 2020-12-15 NOTE — PROGRESS NOTES
Enriqueta Aguirre  64 y.o. female  1960  [address] [facility] [address]  [id number]   [address]:    Telemedicine Progress Note  Juan José Archuleta MD       Encounter Date and Time: December 15, 2020 at 1:59 PM    Consent: Enriqueta Aguirre, who was seen by synchronous (real-time) audio-video technology, and/or her healthcare decision maker, is aware that this patient-initiated, Telehealth encounter on 12/15/2020 is a billable service, with coverage as determined by her insurance carrier. She is aware that she may receive a bill and has provided verbal consent to proceed: Yes. Chief Complaint   Patient presents with    Annual Wellness Visit     History of Present Illness   Enriqueta Aguirre is a 64 y.o. female was evaluated by synchronous (real-time) audio-video technology from home, through a secure patient portal.  Patient presents today for her Annual Wellness Visit. She has had no major changes in her medical history since her last visit. She is compliant with her medications. She follows with Dr. Daniel Stroud (GI) for her UC and IBS. Last visit was 2 weeks ago. She is due a colonoscopy in 1 month. She follows with Dr. Misael Argueta (OBGyn -@ [facility]). Last visit was 3 weeks ago. She no longer requires pap smears. Had her Mammogram this year. She is on an estrogen supplement. She saw her Optometrist today. Lives at home with her . Review of Systems   Review of Systems   Constitutional: Negative for chills and fever. HENT: Negative for congestion and sore throat. Eyes: Negative for blurred vision. Respiratory: Negative for shortness of breath. Cardiovascular: Negative for chest pain. Gastrointestinal: Negative for abdominal pain and blood in stool. Genitourinary: Negative for hematuria. Musculoskeletal: Negative for falls. Neurological: Negative for dizziness, weakness and headaches. Psychiatric/Behavioral: Negative for depression. Vitals/Objective:     General: alert, cooperative, no distress   Mental  status: mental status: alert, oriented to person, place, and time, normal mood, behavior, speech, dress, motor activity, and thought processes   Resp: resp: normal effort and no respiratory distress   Neuro: neuro: no gross deficits   Skin: skin: no discoloration or lesions of concern on visible areas   Due to this being a TeleHealth evaluation, many elements of the physical examination are unable to be assessed. Assessment and Plan:   Tamiko Humphries is a 64 y.o. female who presents for an annual physical exam.     1. Encounter for annual physical exam  - Patient is up to date on her healthcare maintenance. - Continue diet and exercise   - chart updated. - F/u LIPID PANEL; Future    2. Colitis, nonspecific  - Stable, following with GI  - Plan for patient to sign medical release of records form to review which lab work GI has ordered     We discussed the expected course, resolution and complications of the diagnosis(es) in detail. Medication risks, benefits, costs, interactions, and alternatives were discussed as indicated. I advised her to contact the office if her condition worsens, changes or fails to improve as anticipated. She expressed understanding with the diagnosis(es) and plan. Patient understands that this encounter was a temporary measure, and the importance of further follow up and examination was emphasized. Patient verbalized understanding. Patient informed to follow up: lab visit. Electronically Signed: Kaleb Jara MD    Tamiko Humphries is a 64 y.o. female who was evaluated by an audio-video encounter for concerns as above. Patient identification was verified prior to start of the visit. A caregiver was present when appropriate.  Due to this being a TeleHealth encounter (During [address] [address] public [facility] emergency), evaluation of the following organ systems was limited: Vitals/Constitutional/EENT/Resp/CV/GI//MS/Neuro/Skin/Heme-Lymph-Imm. Pursuant to the emergency declaration under the [address] [place] [address] waiver authority and the [employer] Act, this Virtual Visit was conducted, with patient's (and/or legal guardian's) consent, to reduce the patient's risk of exposure to COVID-19 and provide necessary medical care. Services were provided through a synchronous discussion virtually to substitute for in-person clinic visit. I was at home. The patient was at home. History   Patients past medical, surgical and family histories were reviewed and updated. Past Medical History:   Diagnosis Date    CTS (carpal tunnel syndrome) 4/21/2010    IBS (irritable bowel syndrome) 4/21/2010    [facility]2010    Dr. Godwin Francois (GI)      Past Surgical History:   Procedure Laterality Date    HX TOTAL LAPAROSCOPIC HYSTERECTOMY W/ BS&O  2017        Family History   Problem Relation Age of Onset    Cancer Father         lung     Social History     Tobacco Use    Smoking status: Never Smoker    Smokeless tobacco: Never Used   Substance Use Topics    Alcohol use: Yes     Comment: social    Drug use: Not on file       Patient Active Problem List   Diagnosis Code    HTN (hypertension) I10    CTS (carpal tunnel syndrome) G56.00    IBS (irritable bowel syndrome) K58.9    Colitis, nonspecific K52.9          Current Medications/Allergies   Medications and Allergies reviewed:    Current Outpatient Medications   Medication Sig Dispense Refill    mesalamine ER (APRISO) 0.375 gram capsule Take 1.5 g by mouth daily.  azaTHIOprine (IMURAN) 50 mg tablet Take 50 mg by mouth daily.          No Known Allergies

## 2021-01-18 ENCOUNTER — LAB ONLY (OUTPATIENT)
Dept: FAMILY MEDICINE CLINIC | Age: 61
End: 2021-01-18

## 2021-01-18 DIAGNOSIS — Z00.00 ENCOUNTER FOR ANNUAL PHYSICAL EXAM: ICD-10-CM

## 2021-01-19 LAB
CHOLEST SERPL-MCNC: 233 MG/DL (ref 100–199)
HDLC SERPL-MCNC: 65 MG/DL
INTERPRETATION, 910389: NORMAL
LDLC SERPL CALC-MCNC: 142 MG/DL (ref 0–99)
SPECIMEN STATUS REPORT, ROLRST: NORMAL
TRIGL SERPL-MCNC: 146 MG/DL (ref 0–149)
VLDLC SERPL CALC-MCNC: 26 MG/DL (ref 5–40)

## 2022-08-23 ENCOUNTER — OFFICE VISIT (OUTPATIENT)
Dept: FAMILY MEDICINE CLINIC | Age: 62
End: 2022-08-23
Payer: COMMERCIAL

## 2022-08-23 VITALS
TEMPERATURE: 97.5 F | HEART RATE: 78 BPM | SYSTOLIC BLOOD PRESSURE: 130 MMHG | DIASTOLIC BLOOD PRESSURE: 75 MMHG | OXYGEN SATURATION: 99 % | BODY MASS INDEX: 24.62 KG/M2 | HEIGHT: 64 IN | WEIGHT: 144.2 LBS

## 2022-08-23 DIAGNOSIS — M54.41 CHRONIC BILATERAL LOW BACK PAIN WITH BILATERAL SCIATICA: ICD-10-CM

## 2022-08-23 DIAGNOSIS — M81.0 AGE-RELATED OSTEOPOROSIS WITHOUT CURRENT PATHOLOGICAL FRACTURE: ICD-10-CM

## 2022-08-23 DIAGNOSIS — G89.29 CHRONIC BILATERAL LOW BACK PAIN WITH BILATERAL SCIATICA: ICD-10-CM

## 2022-08-23 DIAGNOSIS — M25.562 CHRONIC PAIN OF BOTH KNEES: ICD-10-CM

## 2022-08-23 DIAGNOSIS — M54.42 CHRONIC BILATERAL LOW BACK PAIN WITH BILATERAL SCIATICA: ICD-10-CM

## 2022-08-23 DIAGNOSIS — M25.551 RIGHT HIP PAIN: Primary | ICD-10-CM

## 2022-08-23 DIAGNOSIS — M25.561 CHRONIC PAIN OF BOTH KNEES: ICD-10-CM

## 2022-08-23 DIAGNOSIS — G89.29 CHRONIC PAIN OF BOTH KNEES: ICD-10-CM

## 2022-08-23 PROCEDURE — 99214 OFFICE O/P EST MOD 30 MIN: CPT | Performed by: STUDENT IN AN ORGANIZED HEALTH CARE EDUCATION/TRAINING PROGRAM

## 2022-08-23 NOTE — PROGRESS NOTES
[address] [facility] [address] [place] [address]   Office [phone], Fax [phone]      Chief Complaint:   Brayan Caal is a 63 y.o. female that presents for:     Chief Complaint   Patient presents with    Joint Pain     Assessment/Plan:   Diagnoses and all orders for this visit:    1. Right hip pain: possibly related to gluteal muscles strain vs piriformis syndrome vs osteoarthritis  - Discussed conservative management including proper OTC analgesic use  - Referral to PT  - Obtain X-rays of Bilateral Hips with Pelvis View    2. Chronic bilateral low back pain with bilateral sciatica: no red flag symptoms, has been going on for years, but has worsened recently  - Referral to PT    3. Chronic pain of both knees: likely due to osteoarthritis vs low-grade long-term meniscal damage, special tests for knees negative bilaterally  - Obtain X-rays of Bilateral Knees  - Referral to PT    4. Age-related osteoporosis without current pathological fracture: previously diagnosed in ~2010, but is not currently on medications for this issue, has not followed up with Dexa  scan since diagnosis was made  - Obtain Dexa scan     Follow up: Follow-up and Dispositions    Return in about 4 weeks (around 9/20/2022) for Follow up Chronic Pain. Subjective:   HPI:  Brayan Caal is a 63 y.o. female who presents to clinic for evaluation of joint pains and follow up of Osteoporosis. Chronic Low Back Pain with Bilateral Sciatica: says that she has lower back pain with bilateral sciatica that she has had for quite some time. No REZA. She says that it is worsened if she has to sit or stand for extended periods of time. No numbness and tingling running down her leg, but the pain does travel down her leg at times. She does not have any bowel/bladder incontinence, midline back pain, saddle anesthesia, or fevers. Chronic Bilateral Knee Pain: bilateral knee pain has been going on for years, no REZA.  She says that it has gotten worse recently. Worse with use. No numbness/tingling/weakness. Right Hip Pain: started about 3-4 weeks ago was associated with some numbness at that time which has since improved, but she continues to have pain. Osteoporosis: previously diagnosed in ~2010, but has not follow up since that point. Is not currently on medication for this problem. Health Maintenance:  Health Maintenance Due   Topic Date Due    Hepatitis C Screening  Never done    Depression Screen  Never done    COVID-19 Vaccine (1) Never done    Pneumococcal 0-64 years (1 - PCV) Never done    Shingrix Vaccine Age 50> (2 of 2) 03/14/2020      ROS:   Review of Systems   Constitutional:  Negative for chills and fever. Respiratory:  Negative for cough and shortness of breath. Cardiovascular:  Negative for chest pain and leg swelling. Musculoskeletal:  Positive for back pain and joint pain. Skin:  Negative for itching and rash. Neurological:  Negative for dizziness, tingling, focal weakness, weakness and headaches. Past medical history, social history, and medications personally reviewed. Past Medical History:   Diagnosis Date    CTS (carpal tunnel syndrome) 4/21/2010    IBS (irritable bowel syndrome) 4/21/2010    [facility] 04/21/2010    Dr. Karleen Favre (GI)       Allergies personally reviewed. No Known Allergies   Objective:   Vitals reviewed. Visit Vitals  /75 (BP 1 Location: Right arm, BP Patient Position: Sitting, BP Cuff Size: Adult)   Pulse 78   Temp 97.5 °F (36.4 °C) (Oral)   Ht 5' 4\" (1.626 m)   Wt 144 lb 3.2 oz (65.4 kg)   LMP 07/12/2016   SpO2 99%   BMI 24.75 kg/m²      Physical Exam   Vitals Reviewed. General AO x 3. No distress. Not diaphoretic. No pallor. Extremities No edema of lower extremities. Pulses 2+. Neurological CN II-XII grossly intact. No focal deficits. MSK FROM of back, bilateral hips, and bilateral knees. Full strength throughout. Sensation intact throughout.  Pulses 2+ throughout. TTP of paraspinal muscles bilaterally, straight leg raise negative bilaterally, no midline tenderness. Right gluteus/piriformis region TTP, FADIRs without pain bilaterally, FABERs illicits pain bilaterally. TTP of knee joint lines bilaterally, otherwise not TTP, special tests negative bilaterally. Pt was discussed with Dr. Vipin Lyles (attending physician). I have reviewed pertinent labs results and other data. I have discussed the diagnosis with the patient and the intended plan as seen in the above orders. The patient has received an after-visit summary and questions were answered concerning future plans. I have discussed medication side effects and warnings with the patient as well.     Torrie Snellen, MD  Resident [address] [facility]  08/23/22

## 2022-08-25 PROBLEM — G89.29 CHRONIC PAIN OF BOTH KNEES: Status: ACTIVE | Noted: 2022-08-25

## 2022-08-25 PROBLEM — G89.29 CHRONIC BILATERAL LOW BACK PAIN WITH BILATERAL SCIATICA: Status: ACTIVE | Noted: 2022-08-25

## 2022-08-25 PROBLEM — M25.562 CHRONIC PAIN OF BOTH KNEES: Status: ACTIVE | Noted: 2022-08-25

## 2022-08-25 PROBLEM — M54.41 CHRONIC BILATERAL LOW BACK PAIN WITH BILATERAL SCIATICA: Status: ACTIVE | Noted: 2022-08-25

## 2022-08-25 PROBLEM — M81.0 AGE-RELATED OSTEOPOROSIS WITHOUT CURRENT PATHOLOGICAL FRACTURE: Status: ACTIVE | Noted: 2022-08-25

## 2022-08-25 PROBLEM — M54.42 CHRONIC BILATERAL LOW BACK PAIN WITH BILATERAL SCIATICA: Status: ACTIVE | Noted: 2022-08-25

## 2022-08-25 PROBLEM — M25.561 CHRONIC PAIN OF BOTH KNEES: Status: ACTIVE | Noted: 2022-08-25

## 2022-09-02 ENCOUNTER — TELEPHONE (OUTPATIENT)
Dept: FAMILY MEDICINE CLINIC | Age: 62
End: 2022-09-02

## 2022-09-02 DIAGNOSIS — M81.0 AGE-RELATED OSTEOPOROSIS WITHOUT CURRENT PATHOLOGICAL FRACTURE: Primary | ICD-10-CM

## 2022-09-02 NOTE — TELEPHONE ENCOUNTER
[facility] called stating that they needed a new doctor to put in the order for the done density test, because they can't have residents putting in the orders. If need be there number is [phone] and their fax number is [phone]. Thanks!

## 2022-09-07 ENCOUNTER — TELEPHONE (OUTPATIENT)
Dept: FAMILY MEDICINE CLINIC | Age: 62
End: 2022-09-07

## 2022-09-07 NOTE — TELEPHONE ENCOUNTER
----- Message from Artice Sandhoff sent at 9/7/2022  8:53 AM EDT -----  Subject: Message to Provider    QUESTIONS  Information for Provider? Pt trying to get in touch with office about her   bone density test. Was told by the department where she had her appt that   they could not do it as the  that ordered it is not a full time Dr. Tone Mims tried to call office as well and did not get any reply.  Please   call with information.  ---------------------------------------------------------------------------  --------------  Peter JARAMILLO  [phone]; OK to leave message on voicemail  ---------------------------------------------------------------------------  --------------  SCRIPT ANSWERS  undefined

## 2022-09-07 NOTE — TELEPHONE ENCOUNTER
----- Message from Mari Wu sent at 9/7/2022 10:56 AM EDT -----  Subject: Message to Provider    QUESTIONS  Information for Provider? Manuela Johnson from [address] [place] [address] called asking   about an order. She spoke with someone on 9/2/22 about this issue. She   stated it was a bone density order that was ordered by a resident, but   they did not have them listed so she needed a different ordering   physician. I let her know there is an active order from Dr. Paola Charles and   she said that should work just fine, but she needs that order faxed to   her. Her fax number is [phone].  ---------------------------------------------------------------------------  --------------  [id number]  [phone]; Do not leave any message, patient will call back for answer  ---------------------------------------------------------------------------  --------------  SCRIPT ANSWERS  Relationship to Patient? Third Party  Third Party Type? Hospital?   Representative Name?  Rachael Escobar

## 2022-09-07 NOTE — TELEPHONE ENCOUNTER
Print[facility] for [facility]. Called patient and informed her this was done.

## 2022-09-13 ENCOUNTER — OFFICE VISIT (OUTPATIENT)
Dept: FAMILY MEDICINE CLINIC | Age: 62
End: 2022-09-13
Payer: COMMERCIAL

## 2022-09-13 VITALS
RESPIRATION RATE: 18 BRPM | WEIGHT: 142 LBS | DIASTOLIC BLOOD PRESSURE: 74 MMHG | SYSTOLIC BLOOD PRESSURE: 123 MMHG | HEART RATE: 61 BPM | TEMPERATURE: 97.6 F | HEIGHT: 64 IN | BODY MASS INDEX: 24.24 KG/M2 | OXYGEN SATURATION: 99 %

## 2022-09-13 DIAGNOSIS — Z11.59 ENCOUNTER FOR HEPATITIS C SCREENING TEST FOR LOW RISK PATIENT: ICD-10-CM

## 2022-09-13 DIAGNOSIS — Z01.419 ENCOUNTER FOR WELL WOMAN EXAM: Primary | ICD-10-CM

## 2022-09-13 DIAGNOSIS — Z53.20 HIV SCREENING DECLINED: ICD-10-CM

## 2022-09-13 PROCEDURE — 99396 PREV VISIT EST AGE 40-64: CPT

## 2022-09-13 NOTE — PROGRESS NOTES
Araceli Harrison is a 63 y.o. female    Chief Complaint   Patient presents with    Complete Physical       Visit Vitals  /74 (BP 1 Location: Left upper arm, BP Patient Position: Sitting, BP Cuff Size: Adult)   Pulse 61   Temp 97.6 °F (36.4 °C) (Temporal)   Resp 18   Ht 5' 4\" (1.626 m)   Wt 142 lb (64.4 kg)   SpO2 99%   BMI 24.37 kg/m²       1. \"Have you been to the ER, urgent care clinic since your last visit? Hospitalized since your last visit? \" No    2. \"Have you seen or consulted any other health care providers outside of the [address] [place] [address] since your last visit? \" No     3. For patients aged 39-70: Has the patient had a colonoscopy / FIT/ Cologuard? Yes - no Care Gap present      If the patient is female:    4. For patients aged 41-77: Has the patient had a mammogram within the past 2 years? Yes - no Care Gap present      5. For patients aged 21-65: Has the patient had a pap smear? Yes - no Care Gap present      Health Maintenance Due   Topic Date Due    Hepatitis C Screening  Never done    Depression Screen  Never done    COVID-19 Vaccine (1) Never done    Pneumococcal 0-64 years (1 - PCV) Never done    Shingrix Vaccine Age 50> (2 of 2) 03/14/2020    Flu Vaccine (1) 09/01/2022         Medication Reconciliation completed, changes noted.   Please update medication list.

## 2022-09-13 NOTE — PROGRESS NOTES
Subjective   Fanta Wilson is a 63 y.o. female  who presents to clinic today for annual physical exam.      She would also like to address the following issues: no complaints today    Diet: pt has balanced diet consisting of vitamins, fruits, vegetables, lean meats. Usually skips breakfast.   Exercise: walking 3-4x/wk. Sleep: no issues    Social History:  Sexually activity: yes  Partner type:  exclusively, hysterectomy in 2017  History of STI's: no    Tobacco use: Denies current of former tobacco use   Alcohol use: 4x/week alcohol use, vodka in mixed drinks and wine  Drug use: Denies current drug use    Review of Systems:  Review of Systems   Constitutional:  Negative for activity change, appetite change, chills and fever. HENT:  Negative for sore throat. Eyes:  Negative for visual disturbance. Respiratory:  Negative for cough, choking, shortness of breath and wheezing. Cardiovascular:  Negative for chest pain, palpitations and leg swelling. Gastrointestinal:  Negative for abdominal distention, abdominal pain, constipation, diarrhea, nausea and vomiting. Genitourinary:  Negative for dysuria. Musculoskeletal:  Negative for arthralgias and myalgias. Skin:  Negative for color change. Neurological:  Negative for dizziness, weakness, light-headedness and headaches. Psychiatric/Behavioral:  The patient is not nervous/anxious. Gyn History  No obstetric history on file. Hysterectomy in 2017  Patient's last menstrual period was 07/12/2016.     Sexual History:  As above    Preventative care:  Alcohol abuse screening (CAGE): 0   Depression screening: negative   HIV: No results found for: Tripp Skinner, XHIVWT, HIV1, HIV, HIVR Pt declined screening    HLD Screening:  Lab Results   Component Value Date/Time    Cholesterol, total 233 (H) 01/18/2021 12:00 AM    HDL Cholesterol 65 01/18/2021 12:00 AM    LDL, calculated 142 (H) 01/18/2021 12:00 AM    VLDL, calculated 26 01/18/2021 12:00 AM Triglyceride 146 01/18/2021 12:00 AM     Breast cancer screening: Last Nov 2021, next mammography scheduled in 2022   Colon cancer screening: Pt has UC, next colonoscopy scheduled for Jan 2023   Hep C screening: No results found for: Brandy Daniels [id number], HCGAT Pt amenable to screen today    Lung cancer screening: not warranted    Pt getting DEXA scan tomorrow 9/14  [id number]/Vit [id number] Supplementation: takes vitamin    Past Medical History  Past Medical History:   Diagnosis Date    CTS (carpal tunnel syndrome) 4/21/2010    IBS (irritable bowel syndrome) 4/21/2010    Ulcerative colitis ([facility] [address] 04/21/2010    Dr. Chela Mathis (GI)        Current Medications   Current Outpatient Medications on File Prior to Visit   Medication Sig Dispense Refill    mesalamine ER (APRISO) 0.375 gram capsule Take 0.75 g by mouth daily. azaTHIOprine (IMURAN) 50 mg tablet Take 100 mg by mouth daily. No current facility-administered medications on file prior to visit. Surgical History  Past Surgical History:   Procedure Laterality Date    HX COLONOSCOPY N/A 01/13/2021    by Dr. Chela Mathis, repeat in 2023. Polypectomy and mild diverticulosis.      HX TOTAL LAPAROSCOPIC HYSTERECTOMY W/ BS&O  2017       Family History   Family History   Problem Relation Age of Onset    Cancer Father         lung       Social History  Social History     Socioeconomic History    Marital status:      Spouse name: Not on file    Number of children: Not on file    Years of education: Not on file    Highest education level: Not on file   Occupational History    Not on file   Tobacco Use    Smoking status: Never    Smokeless tobacco: Never   Substance and Sexual Activity    Alcohol use: Yes     Comment: social    Drug use: Not on file    Sexual activity: Yes     Partners: Male   Other Topics Concern    Not on file   Social History Narrative    Not on file     Social Determinants of Health     Financial Resource Strain: Not on file   Food Insecurity: Not on file   Transportation Needs: Not on file   Physical Activity: Not on file   Stress: Not on file   Social Connections: Not on file   Intimate Partner Violence: Not on file   Housing Stability: Not on file         Objective   Vital Signs  Visit Vitals  /74   Pulse 61   Ht 5' 4\" (1.626 m)   Wt 142 lb (64.4 kg)   LMP 07/12/2016   SpO2 99%   BMI 24.37 kg/m²       PHYSICAL EXAM   General appearance - alert, well appearing, and in no distress  Ears - bilateral TM's and external ear canals normal  Nose - normal and patent, no erythema, discharge or polyps  Mouth - mucous membranes moist, pharynx normal without lesions  Neck - supple, no significant adenopathy, thyroid exam: thyroid is normal in size without nodules or tenderness  Chest - clear to auscultation, no wheezes, rales or rhonchi, symmetric air entry  Heart - normal rate, regular rhythm, normal S1, S2, no murmurs, rubs, clicks or gallops  Abdomen - soft, nontender, nondistended, no masses or organomegaly  Neurological - alert, oriented, normal speech, no focal findings or movement disorder noted  Musculoskeletal - no joint tenderness, deformity or swelling  Extremities - no pedal edema noted  Skin - normal coloration and turgor, no rashes, no suspicious skin lesions noted  Breast - deferred    Assessment   Mallika Hirsch is a 63 y.o. female presenting to clinic today for routine annual exam.    Plan   Diagnoses and all orders for this visit:    1. Encounter for well woman exam  -     METABOLIC PANEL, COMPREHENSIVE; Future  -     CBC W/O DIFF; Future  -     LIPID PANEL; Future  -     HEPATITIS C AB; Future    2. HIV screening declined    3. Encounter for hepatitis C screening test for low risk patient    - Discussed importance of diet and exercise. Emphasized a healthy diet avoiding sugary drinks, fried foods, fast food, and packaged foods.  Encouraged to increase workout to at least 30 minutes per day of moderate aerobic exercise for more than 5 times per week.     - All other preventative screenings detailed above  - Return for yearly wellness visits    I discussed the aforementioned diagnoses with the patient as well as the plan of care. All questions were answered and an AVS was provided.      I discussed this patient with Dr. Mouna North (Attending Physician)      Signed By:  Finesse Aquino MD    Family Medicine Resident

## 2022-09-14 LAB
ALBUMIN SERPL-MCNC: 5.3 G/DL (ref 3.8–4.9)
ALBUMIN/GLOB SERPL: 2.4 {RATIO} (ref 1.2–2.2)
ALP SERPL-CCNC: 115 IU/L (ref 44–121)
ALT SERPL-CCNC: 21 IU/L (ref 0–32)
AST SERPL-CCNC: 21 IU/L (ref 0–40)
BILIRUB SERPL-MCNC: 0.7 MG/DL (ref 0–1.2)
BUN SERPL-MCNC: 12 MG/DL (ref 8–27)
BUN/CREAT SERPL: 13 (ref 12–28)
CALCIUM SERPL-MCNC: 10.3 MG/DL (ref 8.7–10.3)
CHLORIDE SERPL-SCNC: 98 MMOL/L (ref 96–106)
CHOLEST SERPL-MCNC: 331 MG/DL (ref 100–199)
CO2 SERPL-SCNC: 24 MMOL/L (ref 20–29)
CREAT SERPL-MCNC: 0.92 MG/DL (ref 0.57–1)
EGFR: 71 ML/MIN/1.73
ERYTHROCYTE [DISTWIDTH] IN BLOOD BY AUTOMATED COUNT: 12.9 % (ref 11.7–15.4)
GLOBULIN SER CALC-MCNC: 2.2 G/DL (ref 1.5–4.5)
GLUCOSE SERPL-MCNC: 118 MG/DL (ref 65–99)
HCT VFR BLD AUTO: 47.1 % (ref 34–46.6)
HCV AB S/CO SERPL IA: <0.1 S/CO RATIO (ref 0–0.9)
HDLC SERPL-MCNC: 72 MG/DL
HGB BLD-MCNC: 15.4 G/DL (ref 11.1–15.9)
IMP & REVIEW OF LAB RESULTS: NORMAL
LDLC SERPL CALC-MCNC: 222 MG/DL (ref 0–99)
MCH RBC QN AUTO: 32.7 PG (ref 26.6–33)
MCHC RBC AUTO-ENTMCNC: 32.7 G/DL (ref 31.5–35.7)
MCV RBC AUTO: 100 FL (ref 79–97)
PLATELET # BLD AUTO: 346 X10E3/UL (ref 150–450)
POTASSIUM SERPL-SCNC: 4.8 MMOL/L (ref 3.5–5.2)
PROT SERPL-MCNC: 7.5 G/DL (ref 6–8.5)
RBC # BLD AUTO: 4.71 X10E6/UL (ref 3.77–5.28)
SODIUM SERPL-SCNC: 140 MMOL/L (ref 134–144)
TRIGL SERPL-MCNC: 195 MG/DL (ref 0–149)
VLDLC SERPL CALC-MCNC: 37 MG/DL (ref 5–40)
WBC # BLD AUTO: 7.7 X10E3/UL (ref 3.4–10.8)

## 2022-09-16 NOTE — PROGRESS NOTES
Elevated cholesterol levels during last labs. No statin recommended because 10-year risk <5%. Encouraged healthy cardiovascular lifestyle choices during visit. CBC, CMP okay. Hep C nonreactive.

## 2022-09-20 ENCOUNTER — OFFICE VISIT (OUTPATIENT)
Dept: FAMILY MEDICINE CLINIC | Age: 62
End: 2022-09-20
Payer: COMMERCIAL

## 2022-09-20 VITALS
OXYGEN SATURATION: 98 % | SYSTOLIC BLOOD PRESSURE: 138 MMHG | BODY MASS INDEX: 24.41 KG/M2 | HEIGHT: 64 IN | TEMPERATURE: 97.8 F | DIASTOLIC BLOOD PRESSURE: 67 MMHG | HEART RATE: 65 BPM | WEIGHT: 143 LBS | RESPIRATION RATE: 18 BRPM

## 2022-09-20 DIAGNOSIS — M54.42 CHRONIC BILATERAL LOW BACK PAIN WITH BILATERAL SCIATICA: Primary | ICD-10-CM

## 2022-09-20 DIAGNOSIS — M54.41 CHRONIC BILATERAL LOW BACK PAIN WITH BILATERAL SCIATICA: Primary | ICD-10-CM

## 2022-09-20 DIAGNOSIS — G89.29 CHRONIC BILATERAL LOW BACK PAIN WITH BILATERAL SCIATICA: Primary | ICD-10-CM

## 2022-09-20 PROCEDURE — 99213 OFFICE O/P EST LOW 20 MIN: CPT | Performed by: STUDENT IN AN ORGANIZED HEALTH CARE EDUCATION/TRAINING PROGRAM

## 2022-09-20 NOTE — PROGRESS NOTES
[address] [place] [address]   Office [phone], Fax [phone]      Chief Complaint:   Nila Gutierrez is a 63 y.o. female that presents for:     Chief Complaint   Patient presents with    Follow-up     Following up on back and hip pain. Subjective:   HPI:  Nila Gutierrez is a 63 y.o. female who presents to clinic for follow up of back pain and joint pains. She says that her back pain has improved. She also says that her hip/knee pains have improved. She has not started physical therapy. Health Maintenance:  Health Maintenance Due   Topic Date Due    COVID-19 Vaccine (1) Never done    Pneumococcal 0-64 years (1 - PCV) Never done    Shingrix Vaccine Age 50> (2 of 2) 03/14/2020    Flu Vaccine (1) 08/01/2022      ROS:   Review of Systems   Constitutional:  Negative for chills and fever. Musculoskeletal:  Negative for back pain (improved) and joint pain (improved). Past medical history, social history, and medications personally reviewed. Past Medical History:   Diagnosis Date    CTS (carpal tunnel syndrome) 4/21/2010    IBS (irritable bowel syndrome) 4/21/2010    Ulcerative colitis ([facility] [address] 04/21/2010    Dr. Naila Mcmillan (GI)       Allergies personally reviewed. No Known Allergies   Objective:   Vitals reviewed. Visit Vitals  /67 (BP 1 Location: Right upper arm, BP Patient Position: Sitting, BP Cuff Size: Adult)   Pulse 65   Temp 97.8 °F (36.6 °C) (Temporal)   Resp 18   Ht 5' 4\" (1.626 m)   Wt 143 lb (64.9 kg)   LMP 07/12/2016   SpO2 98%   BMI 24.55 kg/m²      Physical Exam  Vitals Reviewed. General AO x 3. No distress. Not diaphoretic. No pallor. Assessment/Plan:   Diagnoses and all orders for this visit:    1. Chronic bilateral low back pain with bilateral sciatica: improved  - Provided with exercises  - Discussed physical therapy if worsens again     Follow up: Follow-up and Dispositions    Return if symptoms worsen or fail to improve.         Pt was discussed with Dr. Rashad Barboza (attending physician). I have reviewed pertinent labs results and other data. I have discussed the diagnosis with the patient and the intended plan as seen in the above orders. The patient has received an after-visit summary and questions were answered concerning future plans. I have discussed medication side effects and warnings with the patient as well.     Evie Zambrano MD  Resident [address]  09/20/22

## 2022-09-20 NOTE — PROGRESS NOTES
Chief Complaint   Patient presents with    Follow-up     Following up on back and hip pain. Visit Vitals  /67 (BP 1 Location: Right upper arm, BP Patient Position: Sitting, BP Cuff Size: Adult)   Pulse 65   Temp 97.8 °F (36.6 °C) (Temporal)   Resp 18   Ht 5' 4\" (1.626 m)   Wt 143 lb (64.9 kg)   SpO2 98%   BMI 24.55 kg/m²     1. Have you been to the ER, urgent care clinic since your last visit? Hospitalized since your last visit? No    2. Have you seen or consulted any other health care providers outside of the [address] [place] [address] since your last visit? Include any pap smears or colon screening.  No

## 2023-04-25 ENCOUNTER — DOCUMENTATION ONLY (OUTPATIENT)
Dept: FAMILY MEDICINE CLINIC | Age: 63
End: 2023-04-25